# Patient Record
Sex: FEMALE | Race: WHITE | NOT HISPANIC OR LATINO | Employment: FULL TIME | URBAN - METROPOLITAN AREA
[De-identification: names, ages, dates, MRNs, and addresses within clinical notes are randomized per-mention and may not be internally consistent; named-entity substitution may affect disease eponyms.]

---

## 2024-10-28 ENCOUNTER — OFFICE VISIT (OUTPATIENT)
Dept: URGENT CARE | Facility: CLINIC | Age: 29
End: 2024-10-28
Payer: COMMERCIAL

## 2024-10-28 ENCOUNTER — APPOINTMENT (OUTPATIENT)
Dept: RADIOLOGY | Facility: CLINIC | Age: 29
End: 2024-10-28
Payer: COMMERCIAL

## 2024-10-28 VITALS
TEMPERATURE: 98 F | RESPIRATION RATE: 16 BRPM | SYSTOLIC BLOOD PRESSURE: 140 MMHG | HEART RATE: 120 BPM | OXYGEN SATURATION: 98 % | DIASTOLIC BLOOD PRESSURE: 68 MMHG

## 2024-10-28 DIAGNOSIS — S52.592A OTHER CLOSED FRACTURE OF DISTAL END OF LEFT RADIUS, INITIAL ENCOUNTER: Primary | ICD-10-CM

## 2024-10-28 DIAGNOSIS — M25.532 LEFT WRIST PAIN: ICD-10-CM

## 2024-10-28 PROCEDURE — S9083 URGENT CARE CENTER GLOBAL: HCPCS | Performed by: PHYSICIAN ASSISTANT

## 2024-10-28 PROCEDURE — G0383 LEV 4 HOSP TYPE B ED VISIT: HCPCS | Performed by: PHYSICIAN ASSISTANT

## 2024-10-28 PROCEDURE — 29125 APPL SHORT ARM SPLINT STATIC: CPT | Performed by: PHYSICIAN ASSISTANT

## 2024-10-28 PROCEDURE — 73110 X-RAY EXAM OF WRIST: CPT

## 2024-10-28 NOTE — PROGRESS NOTES
[facility]        NAME: Jacqueline Rapp is a 29 y.o. female  : 1995    MRN: [id number]  DATE: 2024  TIME: 11:38 AM      Assessment and Plan     Other closed fracture of distal end of left radius, initial encounter [S52.285N]  1. Other closed fracture of distal end of left radius, initial encounter  XR wrist 3+ vw left    Splint application    Ambulatory Referral to Orthopedic Surgery        Note:   Xray shows nondisplaced fracture of the distal radius on the left   Put patient in short arm splint   Rest, ice, elevate injury and take OTC analgesia as needed   Follow up with orthopedics     Patient Instructions     Patient Instructions   Patient Education     How to care for a splint   The Basics   Written by the doctors and editors at [employer]   Why do doctors use a splint? -- Splints can be used to treat broken bones (fractures), sprains, and other injuries. They limit movement, reduce pain, and protect the injured body part as it heals.  Unlike a cast, a splint does not completely cover the body part. A splint can be made of:   Plaster or fiberglass that is molded to the body part, like a cast   Pre-molded splints made of plastic or metal that is padded and might be covered with cloth  Some splints are put on like a cast and use an elastic bandage to keep them in place. Other splints are more like a glove or boot, and use straps to keep them in place.  In some cases, the splint stays on until a doctor removes it. Other times, splints can be removed at certain times. For some injuries, doctors might put on a splint first, and then a cast later.  It's important to take care of a splint so that the skin underneath doesn't get hurt or infected.  What can I do to help with pain during the first few days? -- To help with pain under the split during the first few days, you can:   Put ice on the splint - Use a cold gel pack, bag of ice, or bag of frozen vegetables every 1 to 2 hours, for 15  minutes each time. Do not put the ice (or other cold object) directly on the skin.   Keep the splint raised (for example, on pillows) to help reduce swelling - To reduce swelling and pain, the splint needs to be raised above the level of the heart.   Take medicine to relieve the pain - If the doctor prescribed pain-relieving medicine, you can use that. You can also ask the doctor or nurse about taking over-the-counter medicines, such as acetaminophen (sample brand name: Tylenol) or ibuprofen (sample brand names: Advil, Motrin).  What if the splint feels too tight? -- If the splint feels too tight, it might be because of swelling. If swelling becomes severe, it can make it hard to move the fingers or toes. Or the fingers or toes might be blue, gray, or cold to the touch.  If the splint is too tight, you can:   Raise the limb above the level of the heart - This will help reduce swelling. You can prop the limb up on pillows.   Put ice on the splint, as described above.   If elevation and ice do not decrease swelling, loosen the wrap that holds the splint in place, but be sure that it still holds the splint in place.  If the splint still feels too tight after trying these steps, call the doctor or nurse.  Can a splint get wet? -- It depends on what the splint is made of.  Sometimes, a splint can be removed for bathing. If so, remove the splint carefully and dry the skin before putting the splint back on. If the splint is wet, use a clean cloth or hairdryer set on cool to dry it before putting the splint back on.  If the splint needs to stay dry, you can:   Cover the splint with a plastic bag for bathing. Secure the bag with tape or a rubber band to keep it dry. You can also buy a cast or splint cover to use (figure 1).   Keep the splint outside of the tub or shower when bathing. Take extra care because the splint can make it harder to walk, stand, or grab onto things. This raises the risk of falling.  If the splint or  skin under the splint gets wet and you cannot remove the splint, use a hair dryer to blow cool air inside the splint.  What else should I know?    Wear the splint as instructed.   Move or wiggle your fingers or toes often. Moving the joints near the splint can help avoid stiffness.   Do not walk or put weight on the splint unless the doctor says that it is OK. Use crutches, a walker, or a sling if the doctor recommends this.   Do not put anything under the splint to scratch the skin. Instead, if the skin itches, use a fan or hairdryer set on cool to blow air into the splint.   Keep dirt, dust, or sand from getting inside the splint.   Do not use lotion or powder inside the splint.   Do not trim or break off rough edges from the splint without checking with the doctor first. You might be able to use a nail file to smooth any rough edges on the splint.   Do not pull out the padding from inside the splint.  When should I call the doctor? -- Call for advice if:   The pain is severe or is getting worse.   The splint is still too tight after taking the steps above.   There are sores or cuts on your skin under the splint.   There is a crack in the splint, the splint becomes soft, or the splint is too loose.   There is less movement or feeling in your fingers or toes.   The splint smells bad.   The splint becomes soaking wet.  All topics are updated as new evidence becomes available and our peer review process is complete.  This topic retrieved from [employer] on: Feb 26, 2024.  Topic 313197 Version 5.0  Release: 32.2.4 - C32.56  © 2024 [employer]Date, Inc. and/or its affiliates. All rights reserved.  figure 1: Using plastic bags to keep a cast dry     To keep a cast (or splint) dry, cover it with 2 plastic bags, and tape each bag (separately) to the skin with duct tape. In young children, you can use rubber bands instead of tape. This is because removing the tape from the skin can be painful for children.  Graphic 64329 Version  5.0  Consumer Information Use and Disclaimer   Disclaimer: This generalized information is a limited summary of diagnosis, treatment, and/or medication information. It is not meant to be comprehensive and should be used as a tool to help the user understand and/or assess potential diagnostic and treatment options. It does NOT include all information about conditions, treatments, medications, side effects, or risks that may apply to a specific patient. It is not intended to be medical advice or a substitute for the medical advice, diagnosis, or treatment of a health care provider based on the health care provider's examination and assessment of a patient's specific and unique circumstances. Patients must speak with a health care provider for complete information about their health, medical questions, and treatment options, including any risks or benefits regarding use of medications. This information does not endorse any treatments or medications as safe, effective, or approved for treating a specific patient. Up[employer]Date, Inc. and its affiliates disclaim any warranty or liability relating to this information or the use thereof.The use of this information is governed by the Terms of Use, available at https://www.[employer].com/en/know/clinical-effectiveness-terms. 2024© UpToDate, Inc. and its affiliates and/or licensors. All rights reserved.  Copyright   © 2024 Up[employer]Date, Inc. and/or its affiliates. All rights reserved.       Follow up with primary care provider.   Go to ER if symptoms worsen.    Chief Complaint     Chief Complaint   Patient presents with    Wrist Pain     Left wrist, tripped last night and stopped with hand/wrist.         History of Present Illness     Patient presents with left wrist pain x last night. Patient states they tripped and tried to stop themselves with their left hand. Patient took ibuprofen with minimal relief.         Review of Systems     Review of Systems   Constitutional:  Negative  for chills, fatigue and fever.   HENT:  Negative for congestion, ear pain, postnasal drip, rhinorrhea, sinus pressure, sinus pain and sore throat.    Eyes:  Negative for pain and visual disturbance.   Respiratory:  Negative for cough, chest tightness and shortness of breath.    Cardiovascular:  Negative for chest pain and palpitations.   Gastrointestinal:  Negative for abdominal pain, diarrhea, nausea and vomiting.   Genitourinary:  Negative for dysuria and hematuria.   Musculoskeletal:  Positive for arthralgias and joint swelling. Negative for back pain and myalgias.   Skin:  Negative for rash.   Neurological:  Negative for dizziness, seizures, syncope, numbness and headaches.   All other systems reviewed and are negative.        Current Medications     No current outpatient medications on file.    Current Allergies     Allergies as of 10/28/2024    (No Known Allergies)              The following portions of the patient's history were reviewed and updated as appropriate: allergies, current medications, past family history, past medical history, past social history, past surgical history, and problem list.     No past medical history on file.    No past surgical history on file.    No family history on file.      Medications have been verified.        Objective     /68   Pulse (!) 120   Temp 98 °F (36.7 °C)   Resp 16   SpO2 98%   No LMP recorded.         Physical Exam     Physical Exam  Vitals and nursing note reviewed.   Constitutional:       Appearance: Normal appearance. She is normal weight.   HENT:      Head: Normocephalic and atraumatic.   Cardiovascular:      Rate and Rhythm: Normal rate and regular rhythm.      Pulses: Normal pulses.      Heart sounds: Normal heart sounds.   Pulmonary:      Effort: Pulmonary effort is normal.      Breath sounds: Normal breath sounds.   Musculoskeletal:      Comments: Left wrist: Limited AROM due to pain and swelling. Ecchymosis, swelling, and tenderness over distal  "radius. NVI distally.    Skin:     General: Skin is warm and dry.   Neurological:      General: No focal deficit present.      Mental Status: She is alert and oriented to person, place, and time.   Psychiatric:         Mood and Affect: Mood normal.         Behavior: Behavior normal.       Splint application    Date/Time: 10/28/2024 10:45 AM    Performed by: Carmelina Fairbanks PA-C  Authorized by: Carmelina Fairbanks PA-C  Universal Protocol:  Procedure performed by: (Scarlet Cruz CMA and Dania NEIL)  Consent: Verbal consent obtained. Written consent not obtained.  Risks and benefits: risks, benefits and alternatives were discussed  Consent given by: patient  Time out: Immediately prior to procedure a \"time out\" was called to verify the correct patient, procedure, equipment, support staff and site/side marked as required.  Patient understanding: patient states understanding of the procedure being performed  Patient consent: the patient's understanding of the procedure matches consent given  Procedure consent: procedure consent matches procedure scheduled  Patient identity confirmed: verbally with patient    Pre-procedure details:     Sensation:  Normal    Skin color:  Pink  Procedure details:     Laterality:  Left    Location:  Wrist    Wrist:  L wrist    Strapping: no      Splint type:  Volar short arm    Supplies:  Cotton padding, elastic bandage, fiberglass and skin protective strip  Post-procedure details:     Pain:  Improved    Sensation:  Normal    Skin color:  Pink    Patient tolerance of procedure:  Tolerated well, no immediate complications      "

## 2024-10-28 NOTE — PATIENT INSTRUCTIONS
Patient Education     How to care for a splint   The Basics   Written by the doctors and editors at [facility]   Why do doctors use a splint? -- Splints can be used to treat broken bones (fractures), sprains, and other injuries. They limit movement, reduce pain, and protect the injured body part as it heals.  Unlike a cast, a splint does not completely cover the body part. A splint can be made of:   Plaster or fiberglass that is molded to the body part, like a cast   Pre-molded splints made of plastic or metal that is padded and might be covered with cloth  Some splints are put on like a cast and use an elastic bandage to keep them in place. Other splints are more like a glove or boot, and use straps to keep them in place.  In some cases, the splint stays on until a doctor removes it. Other times, splints can be removed at certain times. For some injuries, doctors might put on a splint first, and then a cast later.  It's important to take care of a splint so that the skin underneath doesn't get hurt or infected.  What can I do to help with pain during the first few days? -- To help with pain under the split during the first few days, you can:   Put ice on the splint - Use a cold gel pack, bag of ice, or bag of frozen vegetables every 1 to 2 hours, for 15 minutes each time. Do not put the ice (or other cold object) directly on the skin.   Keep the splint raised (for example, on pillows) to help reduce swelling - To reduce swelling and pain, the splint needs to be raised above the level of the heart.   Take medicine to relieve the pain - If the doctor prescribed pain-relieving medicine, you can use that. You can also ask the doctor or nurse about taking over-the-counter medicines, such as acetaminophen (sample brand name: Tylenol) or ibuprofen (sample brand names: Advil, Motrin).  What if the splint feels too tight? -- If the splint feels too tight, it might be because of swelling. If swelling becomes severe, it can  make it hard to move the fingers or toes. Or the fingers or toes might be blue, gray, or cold to the touch.  If the splint is too tight, you can:   Raise the limb above the level of the heart - This will help reduce swelling. You can prop the limb up on pillows.   Put ice on the splint, as described above.   If elevation and ice do not decrease swelling, loosen the wrap that holds the splint in place, but be sure that it still holds the splint in place.  If the splint still feels too tight after trying these steps, call the doctor or nurse.  Can a splint get wet? -- It depends on what the splint is made of.  Sometimes, a splint can be removed for bathing. If so, remove the splint carefully and dry the skin before putting the splint back on. If the splint is wet, use a clean cloth or hairdryer set on cool to dry it before putting the splint back on.  If the splint needs to stay dry, you can:   Cover the splint with a plastic bag for bathing. Secure the bag with tape or a rubber band to keep it dry. You can also buy a cast or splint cover to use (figure 1).   Keep the splint outside of the tub or shower when bathing. Take extra care because the splint can make it harder to walk, stand, or grab onto things. This raises the risk of falling.  If the splint or skin under the splint gets wet and you cannot remove the splint, use a hair dryer to blow cool air inside the splint.  What else should I know?    Wear the splint as instructed.   Move or wiggle your fingers or toes often. Moving the joints near the splint can help avoid stiffness.   Do not walk or put weight on the splint unless the doctor says that it is OK. Use crutches, a walker, or a sling if the doctor recommends this.   Do not put anything under the splint to scratch the skin. Instead, if the skin itches, use a fan or hairdryer set on cool to blow air into the splint.   Keep dirt, dust, or sand from getting inside the splint.   Do not use lotion or powder  inside the splint.   Do not trim or break off rough edges from the splint without checking with the doctor first. You might be able to use a nail file to smooth any rough edges on the splint.   Do not pull out the padding from inside the splint.  When should I call the doctor? -- Call for advice if:   The pain is severe or is getting worse.   The splint is still too tight after taking the steps above.   There are sores or cuts on your skin under the splint.   There is a crack in the splint, the splint becomes soft, or the splint is too loose.   There is less movement or feeling in your fingers or toes.   The splint smells bad.   The splint becomes soaking wet.  All topics are updated as new evidence becomes available and our peer review process is complete.  This topic retrieved from [employer] on: Feb 26, 2024.  Topic 364648 Version 5.0  Release: 32.2.4 - C32.56  © 2024 UpToDate, Inc. and/or its affiliates. All rights reserved.  figure 1: Using plastic bags to keep a cast dry     To keep a cast (or splint) dry, cover it with 2 plastic bags, and tape each bag (separately) to the skin with duct tape. In young children, you can use rubber bands instead of tape. This is because removing the tape from the skin can be painful for children.  Graphic 69372 Version 5.0  Consumer Information Use and Disclaimer   Disclaimer: This generalized information is a limited summary of diagnosis, treatment, and/or medication information. It is not meant to be comprehensive and should be used as a tool to help the user understand and/or assess potential diagnostic and treatment options. It does NOT include all information about conditions, treatments, medications, side effects, or risks that may apply to a specific patient. It is not intended to be medical advice or a substitute for the medical advice, diagnosis, or treatment of a health care provider based on the health care provider's examination and assessment of a patient's specific  and unique circumstances. Patients must speak with a health care provider for complete information about their health, medical questions, and treatment options, including any risks or benefits regarding use of medications. This information does not endorse any treatments or medications as safe, effective, or approved for treating a specific patient. UpToDate, Inc. and its affiliates disclaim any warranty or liability relating to this information or the use thereof.The use of this information is governed by the Terms of Use, available at https://www.[employer].com/en/know/clinical-effectiveness-terms. 2024© UpToDate, Inc. and its affiliates and/or licensors. All rights reserved.  Copyright   © 2024 UpToDate, Inc. and/or its affiliates. All rights reserved.

## 2024-10-29 ENCOUNTER — OFFICE VISIT (OUTPATIENT)
Dept: OBGYN CLINIC | Facility: CLINIC | Age: 29
End: 2024-10-29
Payer: COMMERCIAL

## 2024-10-29 VITALS — DIASTOLIC BLOOD PRESSURE: 84 MMHG | HEIGHT: 68 IN | HEART RATE: 116 BPM | SYSTOLIC BLOOD PRESSURE: 124 MMHG

## 2024-10-29 DIAGNOSIS — S52.592A OTHER CLOSED FRACTURE OF DISTAL END OF LEFT RADIUS, INITIAL ENCOUNTER: ICD-10-CM

## 2024-10-29 PROBLEM — S52.502A CLOSED FRACTURE OF LEFT DISTAL RADIUS: Status: ACTIVE | Noted: 2024-10-29

## 2024-10-29 PROCEDURE — 29075 APPL CST ELBW FNGR SHORT ARM: CPT | Performed by: ORTHOPAEDIC SURGERY

## 2024-10-29 PROCEDURE — 99203 OFFICE O/P NEW LOW 30 MIN: CPT | Performed by: ORTHOPAEDIC SURGERY

## 2024-10-29 NOTE — PROGRESS NOTES
Orthopaedics Office Visit - 1st Patient Visit    ASSESSMENT/PLAN:    Assessment:   Left non-displaced distal radius fracture     DOI 10/27/24       Plan:   Discussed conservative treatment with patient at length  Non-weight bearing left upper extremity in short arm cast   Please keep cast dry at all times. Contact office if cast becomes wet or damaged  Over the counter analgesics as needed / directed   Ice / heat as directed   Follow up 3 weeks with repeat XR       To Do Next Visit:  Cast off,  XR left wrist , consider progressing to weight bearing and brace usage    _____________________________________________________  CHIEF COMPLAINT:  Chief Complaint   Patient presents with    Left Wrist - Pain         SUBJECTIVE:  Jacqueline Rapp is a 29 y.o. female who presents to the office for an initial evaluation of her left wrist.  Patient states that she had a mechanical fall landing on her left wrist 2 days ago.  Patient states that she was seen and evaluated at the local urgent care where x-rays were taken showing a nondisplaced distal radius fracture.  Was placed in a splint which she has maintained since her injury.  Patient states that she has minimal pain in the wrist since her splint has been removed.  Patient denies any prior history of left wrist pain or injuries.  Patient states that her pain is predominantly over the volar aspect of the wrist and becomes worse with direct contact.  Patient has been compliant with nonweightbearing restrictions of the left upper extremity.  Patient denies any numbness or tingling in her hand.  Patient has not been taking any type of pain medication.  Patient offers no other complaints at this time.      PAST MEDICAL HISTORY:  History reviewed. No pertinent past medical history.    PAST SURGICAL HISTORY:  History reviewed. No pertinent surgical history.    FAMILY HISTORY:  History reviewed. No pertinent family history.    SOCIAL HISTORY:  Social History     Tobacco Use    Smoking  "status: Some Days     Current packs/day: 0.25     Average packs/day: 0.3 packs/day for 0.4 years (0.1 ttl pk-yrs)     Types: Cigarettes     Start date: 06/2024    Smokeless tobacco: Never       MEDICATIONS:  No current outpatient medications on file.    ALLERGIES:  No Known Allergies    LABS:  HgA1c: No results found for: \"HGBA1C\"  BMP: No results found for: \"GLUCOSE\", \"CALCIUM\", \"NA\", \"K\", \"CO2\", \"CL\", \"BUN\", \"CREATININE\"  CBC: No components found for: \"CBC\"    _____________________________________________________  PHYSICAL EXAMINATION:  Vital signs: /84   Pulse (!) 116   Ht 5' 8\" (1.727 m)   General: No acute distress, awake and alert  Psychiatric: Mood and affect appear appropriate  HEENT: Trachea Midline, No torticollis, no apparent facial trauma  Cardiovascular: No audible murmurs; Extremities appear perfused  Pulmonary: No audible wheezing or stridor  Skin: No open lesions; see further details (if any) below      MUSCULOSKELETAL EXAMINATION:  Left wrist examination :  Patient sitting comfortably in the office in no apparent distress   Volar ecchymosis noted with no open wounds or lacerations appreciated.     Tenderness to palpation noted over the volar aspect of the distal radius.  No other bony or soft tissue tenderness to palpation noted at this time.  Limited range of motion of the wrist in all planes of motion secondary to pain  NV intact    ____________________________________________________  STUDIES REVIEWED:  I personally reviewed the images obtained on 10/28/24 and my independent interpretation is as follows:  Left wrist XR 3 views :  Impacted, minimally displaced left distal radius fracture.        PROCEDURES PERFORMED:  Cast application    Date/Time: 10/29/2024 1:45 PM    Performed by: Ziyad Acosta MD  Authorized by: Ziyad Acosta MD  Universal Protocol:  procedure performed by consultantConsent: Verbal consent obtained.  Risks and benefits: risks, benefits and alternatives were " "discussed  Consent given by: patient  Patient understanding: patient states understanding of the procedure being performed  Site marked: the operative site was marked    Pre-procedure details:     Sensation:  Normal  Procedure details:     Laterality:  Left    Location:  Wrist    Wrist:  L wrist    Strapping: no  Cast type:  Short arm        Supplies:  Cotton padding and Ortho-Glass  Post-procedure details:     Pain:  Improved    Sensation:  Normal    Patient tolerance of procedure:  Tolerated well, no immediate complications              Lito Soto PA-C - assisting  Ziyad Acosta MD                                    Portions of the record may have been created with voice recognition software.  Occasional wrong word or \"sound a like\" substitutions may have occurred due to the inherent limitations of voice recognition software.  Read the chart carefully and recognize, using context, where substitutions have occurred.    "

## 2024-10-29 NOTE — PATIENT INSTRUCTIONS
Precipitating factors  *   Onset *   Duration *   Frequency *  Character *   Exacerbating factors *   Alleviating factors *   Radiation *  Severity *   Associated SX *   Self TX  *   Previous HX  *   Effects on ADLs  *

## 2024-11-14 DIAGNOSIS — S52.592A OTHER CLOSED FRACTURE OF DISTAL END OF LEFT RADIUS, INITIAL ENCOUNTER: Primary | ICD-10-CM

## 2024-11-19 ENCOUNTER — OFFICE VISIT (OUTPATIENT)
Dept: OBGYN CLINIC | Facility: CLINIC | Age: 29
End: 2024-11-19
Payer: COMMERCIAL

## 2024-11-19 ENCOUNTER — APPOINTMENT (OUTPATIENT)
Dept: RADIOLOGY | Facility: CLINIC | Age: 29
End: 2024-11-19
Payer: COMMERCIAL

## 2024-11-19 VITALS — HEIGHT: 68 IN

## 2024-11-19 DIAGNOSIS — S52.592A OTHER CLOSED FRACTURE OF DISTAL END OF LEFT RADIUS, INITIAL ENCOUNTER: ICD-10-CM

## 2024-11-19 DIAGNOSIS — S52.592A OTHER CLOSED FRACTURE OF DISTAL END OF LEFT RADIUS, INITIAL ENCOUNTER: Primary | ICD-10-CM

## 2024-11-19 PROCEDURE — 73110 X-RAY EXAM OF WRIST: CPT

## 2024-11-19 PROCEDURE — 99213 OFFICE O/P EST LOW 20 MIN: CPT | Performed by: ORTHOPAEDIC SURGERY

## 2024-11-19 NOTE — PROGRESS NOTES
"Orthopaedics Office Visit - Established Patient Visit    ASSESSMENT/PLAN:    Assessment:   Left non-displaced distal radius fracture     DOI 10/27/24   Mild loss of height and volar tilt in cast    Pt does admit to using arm/'using more than should' while in cast    Today near full ROM without pain      Plan:   Cast was removed without complication  X-rays obtained and reviewed  Patient was placed into wrist brace  Discussed left distal radius ORIF with the patient in detail as a possibility. However, I discussed patient case with two hand surgeon partners/colleagues. Fracture in satisfactory alignment for continued nonop management if does not proceed to worse alignment.   I will call the patient and further discuss treatment options  Remain JIHAN HOLBROOK      To Do Next Visit:  Reevaluation in 1 wk with repeat xray    _____________________________________________________  CHIEF COMPLAINT:  No chief complaint on file.        SUBJECTIVE:  Jacqueline Rapp is a 29 y.o. female who presents to the office for an follow up evaluation of her left distal radius fracture DOI: 10/27/2024. Overall the patient is doing well today. She is managing pain with ibuprofen. The patient admits mild stiffness in her wrist today secondary to being in the cast. The patient admits she as not been completely compliant with WB status.       PAST MEDICAL HISTORY:  History reviewed. No pertinent past medical history.    PAST SURGICAL HISTORY:  History reviewed. No pertinent surgical history.    FAMILY HISTORY:  History reviewed. No pertinent family history.    SOCIAL HISTORY:  Social History     Tobacco Use    Smoking status: Some Days     Current packs/day: 0.25     Average packs/day: 0.3 packs/day for 0.5 years (0.1 ttl pk-yrs)     Types: Cigarettes     Start date: 06/2024    Smokeless tobacco: Never       MEDICATIONS:  No current outpatient medications on file.    ALLERGIES:  No Known Allergies    LABS:  HgA1c: No results found for: \"HGBA1C\"  BMP: " "No results found for: \"GLUCOSE\", \"CALCIUM\", \"NA\", \"K\", \"CO2\", \"CL\", \"BUN\", \"CREATININE\"  CBC: No components found for: \"CBC\"    _____________________________________________________  PHYSICAL EXAMINATION:  Vital signs: Ht 5' 8\" (1.727 m)   General: No acute distress, awake and alert  Psychiatric: Mood and affect appear appropriate  HEENT: Trachea Midline, No torticollis, no apparent facial trauma  Cardiovascular: No audible murmurs; Extremities appear perfused  Pulmonary: No audible wheezing or stridor  Skin: No open lesions; see further details (if any) below      MUSCULOSKELETAL EXAMINATION:  Left wrist examination :  Patient sitting comfortably in the office in no apparent distress   no ecchymosis noted and no open wounds or lacerations appreciated.     No tenderness to palpation noted over the distal radius  No other bony or soft tissue tenderness to palpation noted at this time.  Limited range of motion of the wrist in all planes of motion secondary to stiffness but withotu pain  NV intact    ____________________________________________________  STUDIES REVIEWED:  I personally reviewed the images obtained in office today and my independent interpretation is as follows:  X-rays of the left wrist obtained 11/19/2024 demonstrate loss of radial height an volar tilt compared to previous study. No ulnar positivity. No dorsal deformity.    PROCEDURES PERFORMED:  Procedures  None.        Scribe Attestation      I,:  Margoth Apple am acting as a scribe while in the presence of the attending physician.:       I,:  Ziyad Acosta MD personally performed the services described in this documentation    as scribed in my presence.:             "

## 2024-11-25 ENCOUNTER — TELEPHONE (OUTPATIENT)
Dept: OBGYN CLINIC | Facility: HOSPITAL | Age: 29
End: 2024-11-25

## 2024-11-25 NOTE — TELEPHONE ENCOUNTER
Called to schedule f/u appt with Dr Acosta on 12/3 or 12/5 at [facility].    LM on  for pt to call back and schedule.